# Patient Record
Sex: MALE | Race: WHITE
[De-identification: names, ages, dates, MRNs, and addresses within clinical notes are randomized per-mention and may not be internally consistent; named-entity substitution may affect disease eponyms.]

---

## 2019-04-23 ENCOUNTER — HOSPITAL ENCOUNTER (INPATIENT)
Dept: HOSPITAL 95 - ER | Age: 84
LOS: 2 days | Discharge: HOME | DRG: 189 | End: 2019-04-25
Attending: HOSPITALIST | Admitting: HOSPITALIST
Payer: MEDICARE

## 2019-04-23 VITALS — HEIGHT: 69.02 IN | WEIGHT: 112.44 LBS | BODY MASS INDEX: 16.65 KG/M2

## 2019-04-23 DIAGNOSIS — J43.9: ICD-10-CM

## 2019-04-23 DIAGNOSIS — I48.91: ICD-10-CM

## 2019-04-23 DIAGNOSIS — Z93.0: ICD-10-CM

## 2019-04-23 DIAGNOSIS — Q75.4: ICD-10-CM

## 2019-04-23 DIAGNOSIS — F17.210: ICD-10-CM

## 2019-04-23 DIAGNOSIS — J96.22: ICD-10-CM

## 2019-04-23 DIAGNOSIS — I25.10: ICD-10-CM

## 2019-04-23 DIAGNOSIS — J96.21: Primary | ICD-10-CM

## 2019-04-23 DIAGNOSIS — H91.90: ICD-10-CM

## 2019-04-23 DIAGNOSIS — I50.22: ICD-10-CM

## 2019-04-23 LAB
ALBUMIN SERPL BCP-MCNC: 3.1 G/DL (ref 3.4–5)
ALBUMIN/GLOB SERPL: 0.8 {RATIO} (ref 0.8–1.8)
ALT SERPL W P-5'-P-CCNC: 31 U/L (ref 12–78)
ANION GAP SERPL CALCULATED.4IONS-SCNC: 2 MMOL/L (ref 6–16)
AST SERPL W P-5'-P-CCNC: 26 U/L (ref 12–37)
BASOPHILS # BLD AUTO: 0.03 K/MM3 (ref 0–0.23)
BASOPHILS NFR BLD AUTO: 0 % (ref 0–2)
BILIRUB SERPL-MCNC: 0.6 MG/DL (ref 0.1–1)
BUN SERPL-MCNC: 48 MG/DL (ref 8–24)
CALCIUM SERPL-MCNC: 9.4 MG/DL (ref 8.5–10.1)
CHLORIDE SERPL-SCNC: 97 MMOL/L (ref 98–108)
CO2 SERPL-SCNC: 42 MMOL/L (ref 21–32)
CREAT SERPL-MCNC: 0.76 MG/DL (ref 0.6–1.2)
DEPRECATED RDW RBC AUTO: 52.2 FL (ref 35.1–46.3)
EOSINOPHIL # BLD AUTO: 0.33 K/MM3 (ref 0–0.68)
EOSINOPHIL NFR BLD AUTO: 3 % (ref 0–6)
ERYTHROCYTE [DISTWIDTH] IN BLOOD BY AUTOMATED COUNT: 13.2 % (ref 11.7–14.2)
GLOBULIN SER CALC-MCNC: 3.9 G/DL (ref 2.2–4)
GLUCOSE SERPL-MCNC: 118 MG/DL (ref 70–99)
HCT VFR BLD AUTO: 41.5 % (ref 37–53)
HGB BLD-MCNC: 12.8 G/DL (ref 13.5–17.5)
IMM GRANULOCYTES # BLD AUTO: 0.04 K/MM3 (ref 0–0.1)
IMM GRANULOCYTES NFR BLD AUTO: 0 % (ref 0–1)
LYMPHOCYTES # BLD AUTO: 0.64 K/MM3 (ref 0.84–5.2)
LYMPHOCYTES NFR BLD AUTO: 5 % (ref 21–46)
MCHC RBC AUTO-ENTMCNC: 30.8 G/DL (ref 31.5–36.5)
MCV RBC AUTO: 107 FL (ref 80–100)
MONOCYTES # BLD AUTO: 0.59 K/MM3 (ref 0.16–1.47)
MONOCYTES NFR BLD AUTO: 5 % (ref 4–13)
NEUTROPHILS # BLD AUTO: 10.85 K/MM3 (ref 1.96–9.15)
NEUTROPHILS NFR BLD AUTO: 87 % (ref 41–73)
NRBC # BLD AUTO: 0 K/MM3 (ref 0–0.02)
NRBC BLD AUTO-RTO: 0 /100 WBC (ref 0–0.2)
PH BLDA: 7.34 [PH] (ref 7.35–7.45)
PH BLDA: 7.39 [PH] (ref 7.35–7.45)
PH BLDA: 7.39 [PH] (ref 7.35–7.45)
PLATELET # BLD AUTO: 119 K/MM3 (ref 150–400)
POTASSIUM SERPL-SCNC: 4.3 MMOL/L (ref 3.5–5.5)
PROT SERPL-MCNC: 7 G/DL (ref 6.4–8.2)
SODIUM SERPL-SCNC: 141 MMOL/L (ref 136–145)
TROPONIN I SERPL-MCNC: 0.16 NG/ML (ref 0–0.04)

## 2019-04-23 PROCEDURE — 5A09357 ASSISTANCE WITH RESPIRATORY VENTILATION, LESS THAN 24 CONSECUTIVE HOURS, CONTINUOUS POSITIVE AIRWAY PRESSURE: ICD-10-PCS

## 2019-04-23 NOTE — NUR
ADMIT NOTE:  ADMIT 83 YEAR OLD MALE TO ICU 3, TO HOSPITALIST, DR GRACE
SERVICES, PER [facility] VIA ER. ACCOMPANIED BY SON. MONITOR PLACED SHOWING AFIB
HEART RATE 90'S-100. AWAKE COOPERATIVE VERY HARD OF HEARING HOWEVER READS
LIPS, AND ABLE TO COMMUNICATE WITH SON. LUNG SOUNDS COARSE/CLEAR UPPER LOBES
WITH DECREASED SOUNDS IN THE BASES. WITH WHEEZES INTERMITTENTLY.  O2 IN PLACE
AT 4L/MIN PER NASAL CANNULA.ABDOMEN SOFT WITH BOWEL SOUNDS FOUR QUADS. ATTENDS
IN PLACE SECONDARY TO INCONTINENCE. SKIN DRY, FLAKEY, FRAGILE.NO EDEMA NOTED
PEDAL PULSES PRESENT. INFORMATION OBTAINED AND CONSENTS SIGNED BY SON.
DRESSING DRY AND INTACT TO COCCYX, FOREHEAD . GUAZE DRESSING INTACT OVER
RECENTLY CLOSED TRACH SITE. PLACED ON  BIPAP BY RT. CONTINUE TO MONITOR AND
REPORT CHANGE IN PATIENT CONDITION.

## 2019-04-24 LAB
ALBUMIN SERPL BCP-MCNC: 3.4 G/DL (ref 3.4–5)
ALBUMIN/GLOB SERPL: 0.8 {RATIO} (ref 0.8–1.8)
ALT SERPL W P-5'-P-CCNC: 36 U/L (ref 12–78)
AST SERPL W P-5'-P-CCNC: 40 U/L (ref 12–37)
BASOPHILS # BLD AUTO: 0.01 K/MM3 (ref 0–0.23)
BASOPHILS NFR BLD AUTO: 0 % (ref 0–2)
BILIRUB SERPL-MCNC: 0.5 MG/DL (ref 0.1–1)
BUN SERPL-MCNC: 53 MG/DL (ref 8–24)
CALCIUM SERPL-MCNC: 9.4 MG/DL (ref 8.5–10.1)
CHLORIDE SERPL-SCNC: 94 MMOL/L (ref 98–108)
CO2 SERPL-SCNC: >45 MMOL/L (ref 21–32)
CREAT SERPL-MCNC: 0.86 MG/DL (ref 0.6–1.2)
DEPRECATED RDW RBC AUTO: 51.6 FL (ref 35.1–46.3)
EOSINOPHIL # BLD AUTO: 0.02 K/MM3 (ref 0–0.68)
EOSINOPHIL NFR BLD AUTO: 0 % (ref 0–6)
ERYTHROCYTE [DISTWIDTH] IN BLOOD BY AUTOMATED COUNT: 13 % (ref 11.7–14.2)
GLOBULIN SER CALC-MCNC: 4.2 G/DL (ref 2.2–4)
GLUCOSE SERPL-MCNC: 128 MG/DL (ref 70–99)
HCT VFR BLD AUTO: 44.5 % (ref 37–53)
HGB BLD-MCNC: 13.7 G/DL (ref 13.5–17.5)
IMM GRANULOCYTES # BLD AUTO: 0.04 K/MM3 (ref 0–0.1)
IMM GRANULOCYTES NFR BLD AUTO: 0 % (ref 0–1)
LYMPHOCYTES # BLD AUTO: 0.48 K/MM3 (ref 0.84–5.2)
LYMPHOCYTES NFR BLD AUTO: 4 % (ref 21–46)
MCHC RBC AUTO-ENTMCNC: 30.8 G/DL (ref 31.5–36.5)
MCV RBC AUTO: 106 FL (ref 80–100)
MONOCYTES # BLD AUTO: 0.06 K/MM3 (ref 0.16–1.47)
MONOCYTES NFR BLD AUTO: 1 % (ref 4–13)
NEUTROPHILS # BLD AUTO: 10.52 K/MM3 (ref 1.96–9.15)
NEUTROPHILS NFR BLD AUTO: 95 % (ref 41–73)
NRBC # BLD AUTO: 0 K/MM3 (ref 0–0.02)
NRBC BLD AUTO-RTO: 0 /100 WBC (ref 0–0.2)
PH BLDA: 7.44 [PH] (ref 7.35–7.45)
PLATELET # BLD AUTO: 144 K/MM3 (ref 150–400)
POTASSIUM SERPL-SCNC: 4.3 MMOL/L (ref 3.5–5.5)
PROT SERPL-MCNC: 7.6 G/DL (ref 6.4–8.2)
SODIUM SERPL-SCNC: 143 MMOL/L (ref 136–145)

## 2019-04-24 NOTE — NUR
ASSUMING CARE
RECEIVED PT REPORT FROM JEFFREY RODRIGES. PT IS ALERT AND ORIENTED AT THE TIME OF
SHIFT REPORT. PT IS MED NO TELE STATUS AT THIS TIME.
PT HAD A PREVIOUS TRACH, THAT IS CURRENTLY CLOSED AT THIS TIME WITH A
PETROLEUM GAUZE AND TRANSPARENT DRESSING IN PLACE. PT IS ON 2L O2 VIA NC AT
THIS TIME WITH SPO2 IN THE 90'S AT THIS TIME. PT LUNG SOUNDS ARE CLEAR AT THE
TIME OF ASSESSEMNT.
PT HAS HEALING BURN WOUNDS TO FACE AND NOSE RELATED TO SMOKING WITH USE OF O2.
PT IS HARD OF HEARING, PT HAS HEARING AIDS IN PLACE AT THIS TIME.
PT IS SALINE LOCKED AND IS NOT RECEIVING ANY FLUIDS AT THIS TIME.
PT HAS ATTENDS IN PLACE. AS PER REPORT PT IS ABLE TO USE URINAL WITHOUT
ASSISTANCE.
PT HAS CALL LIGHT IN PLACE AND IS ABLE TO MAKE NEEDS KNOWN. ASSUMNG CARE OF PT
AT THE TIME OF SHIFT REPORT. WILL CONTINUE TO MONITOR PT.

## 2019-04-24 NOTE — NUR
SHIFT SUMMARY : RESTS QUIETLY WHEN UNDISTURBED. MONITOR INTACT SHOWING AFIB.
HEART RATE 90'-100'S VERY HARD OF HEARING. HOWEVER READS LIPS AND COMMUNICATES
NEEDS/WISHES WELL. REQUEST CUP OF COFFEE AND TOLERATES WELL. LUNG SOUNDS CLEAR
WITH DECREASED SOUNDS AND WHEEZES IN THE BASES. O2 IN PLACE AT 4L/MIN PER
NASAL CANNULA.SP02 65-97% RESPIRATIONS 20'S. WELL HEALING FACIAL BURNS  NOTED.
DRESSINGS INTACT TO FOREHEAD, COCCYX AND THROAT.  ATTENDS UN PLACE SECONDARY
TO INCONTINENCE.  CONTINUE TO MONITOR AND REPORT CHANGE IN PATIENT CONDITION.

## 2019-04-24 NOTE — NUR
Gave patient bath and changed garcia and gown. Patient ate 100% dinner. He
continue to med no tele and is currently resting. He sats mid 90% on 2L O2 via
NC. Son remains at bedside with father.

## 2019-04-24 NOTE — NUR
PT REPORT.
PT TO BE TRANSFERED TO ICU ROOM 14. REPORT GIVEN TO JEFFREY REHMAN AND JEFFREY MALONEY.
PT ASSESSMENT REMAINS UNCHANGED AT THE TIME OF PT REPORT. ALL QUESTIONS
ANSWERED AT THE TIME OF REPORT. PT TRANSFERED TO ICU ROOM 14 AT 2245.

## 2019-04-24 NOTE — NUR
Patient has been resting. Dr Montenegro consulted and talked with son. Patient may
be discharged home tomorrow. He remains on 2L O2 via NC and sats low 90%. Son
has been in and out and assist with care. No other significant changes.

## 2019-04-24 NOTE — NUR
Patient has called to use urinal twice and has had about 200ml each time. on
continues at bedside. Records from [facility] have been faxed. VSS. No other
significant changes with patient.

## 2019-04-24 NOTE — NUR
Recieved report from Jacinta RN. Patient is supine in bed with HOB at 30
degrees. He is alert and oriented and is abl;e to communicate his needs. He is
very [place] and read lips when talking slow. He is verbal with communication. He
is on 2L O2 via NC and sats 92%, RT in room. He has productive cough that he
clears. He has FS 20ga IV in LAC dressing intact and site WNL's. He also has
18ga IV in RENNY and dressing intact and site WNL's and flushed and SL'd. he is
is in A-Fib/Flutter rate low 100's. Son just arrived in room. Dressing from
trach closure C/D/I with gauze and clear op site. He is drinking OJ and
tolerating breakfast well.

## 2019-04-24 NOTE — NUR
Patient sitting up in bed and request coffee. He tolerated meds well one at a
time with water. He remains on 2L O2 vis NC and satsa 92%. Ate 90% breakfast
with out difficulty. Dr Nielsen has been by to see patient and Dr Montenegro has been
notified of consult. No other changes.

## 2019-04-24 NOTE — NUR
Gave patient sponge bath and changed lined. Changed and cleaned trach op site
and placed petroleum dressing with sterile gauze and two clear op site
dressings. Patient now Med no tele status. He has cochlear type hearing
deviceright side of head. He remains SL'd.

## 2019-04-25 NOTE — NUR
ASSUMED CARE:
 
PT RESTING QUIETLY AT THIS TIME. 2L NC RUNNING. APPEARS TO BE IN NO ACUTE
DISTRESS AT THIS TIME.

## 2019-04-25 NOTE — NUR
DISCHARGE:
 
PT GIVEN INSTRUCTIONS ON DISCHARGE, NEW MEDS, AND FOLLOW UP APPOINTMENTS. PT'S
SON WANTED TO DO A TRIAL ON HOME OXYGEN TO CHECK SATURATIONS TO MAKE SURE HOME
TANK WORKING APPROPRIATELY FOR CAR RIDE HOME TO [place]. PT SATTING IN MID
90S ON 3L ON HOME TANK. IV DC'D WNL. PT ESCORTED OUT VIA WHEEL CHAIR BY THIS
RN

## 2019-04-25 NOTE — NUR
END OF SHIFT SUMMARY
 
ASSUMED CARE OF PT FROM CORRY MURPHY AROUND 2200. PT ALERT AND ORIENTED TALKING
WITH STAFF. PT VERY [place]. LUNGS DIM T/O. PT IN AFIB. HEALING BURNS NOTED ON
FACE. CONGENITAL FACIAL DEFORMITY NOTED. PT STATES BACK PAIN OF 3, MEDICATED
WITH TYLENOL. PT HAS APPEARED TO SLEEP FOR MAJORITY OF SHIFT.
SKIN APPEARS TO BE FRAGILE. PT TO POSSIBLY DC TODAY. WILL CONTINUE TO MONITOR
UNTIL SHIFT CHANGE.